# Patient Record
Sex: FEMALE | Race: WHITE | NOT HISPANIC OR LATINO
[De-identification: names, ages, dates, MRNs, and addresses within clinical notes are randomized per-mention and may not be internally consistent; named-entity substitution may affect disease eponyms.]

---

## 2017-12-28 ENCOUNTER — TRANSCRIPTION ENCOUNTER (OUTPATIENT)
Age: 32
End: 2017-12-28

## 2018-06-15 ENCOUNTER — OUTPATIENT (OUTPATIENT)
Dept: OUTPATIENT SERVICES | Facility: HOSPITAL | Age: 33
LOS: 1 days | End: 2018-06-15
Payer: COMMERCIAL

## 2018-06-15 VITALS
SYSTOLIC BLOOD PRESSURE: 100 MMHG | WEIGHT: 121.03 LBS | DIASTOLIC BLOOD PRESSURE: 70 MMHG | OXYGEN SATURATION: 100 % | HEIGHT: 60 IN | TEMPERATURE: 98 F | HEART RATE: 72 BPM | RESPIRATION RATE: 16 BRPM

## 2018-06-15 DIAGNOSIS — K40.90 UNILATERAL INGUINAL HERNIA, WITHOUT OBSTRUCTION OR GANGRENE, NOT SPECIFIED AS RECURRENT: ICD-10-CM

## 2018-06-15 DIAGNOSIS — Z01.818 ENCOUNTER FOR OTHER PREPROCEDURAL EXAMINATION: ICD-10-CM

## 2018-06-15 DIAGNOSIS — Z98.890 OTHER SPECIFIED POSTPROCEDURAL STATES: Chronic | ICD-10-CM

## 2018-06-15 PROCEDURE — G0463: CPT

## 2018-06-15 RX ORDER — LIDOCAINE HCL 20 MG/ML
0.2 VIAL (ML) INJECTION ONCE
Qty: 0 | Refills: 0 | Status: DISCONTINUED | OUTPATIENT
Start: 2018-06-29 | End: 2018-07-14

## 2018-06-15 RX ORDER — SODIUM CHLORIDE 9 MG/ML
3 INJECTION INTRAMUSCULAR; INTRAVENOUS; SUBCUTANEOUS EVERY 8 HOURS
Qty: 0 | Refills: 0 | Status: DISCONTINUED | OUTPATIENT
Start: 2018-06-29 | End: 2018-07-14

## 2018-06-15 NOTE — H&P PST ADULT - HISTORY OF PRESENT ILLNESS
33 year old female with h/o left inguinal hernia repair with mesh in 2005, reports having right inguinal hernia repair on 06/29/18.

## 2018-06-15 NOTE — H&P PST ADULT - NSANTHOSAYNRD_GEN_A_CORE
No. RAE screening performed.  STOP BANG Legend: 0-2 = LOW Risk; 3-4 = INTERMEDIATE Risk; 5-8 = HIGH Risk

## 2018-06-28 ENCOUNTER — TRANSCRIPTION ENCOUNTER (OUTPATIENT)
Age: 33
End: 2018-06-28

## 2018-06-29 ENCOUNTER — OUTPATIENT (OUTPATIENT)
Dept: OUTPATIENT SERVICES | Facility: HOSPITAL | Age: 33
LOS: 1 days | End: 2018-06-29
Payer: COMMERCIAL

## 2018-06-29 VITALS
HEART RATE: 61 BPM | OXYGEN SATURATION: 98 % | SYSTOLIC BLOOD PRESSURE: 95 MMHG | TEMPERATURE: 98 F | RESPIRATION RATE: 16 BRPM | DIASTOLIC BLOOD PRESSURE: 51 MMHG

## 2018-06-29 VITALS
HEART RATE: 64 BPM | TEMPERATURE: 98 F | DIASTOLIC BLOOD PRESSURE: 65 MMHG | RESPIRATION RATE: 18 BRPM | SYSTOLIC BLOOD PRESSURE: 103 MMHG | OXYGEN SATURATION: 100 % | WEIGHT: 121.03 LBS | HEIGHT: 60 IN

## 2018-06-29 DIAGNOSIS — K40.90 UNILATERAL INGUINAL HERNIA, WITHOUT OBSTRUCTION OR GANGRENE, NOT SPECIFIED AS RECURRENT: ICD-10-CM

## 2018-06-29 DIAGNOSIS — Z98.890 OTHER SPECIFIED POSTPROCEDURAL STATES: Chronic | ICD-10-CM

## 2018-06-29 PROCEDURE — C1781: CPT

## 2018-06-29 PROCEDURE — 49650 LAP ING HERNIA REPAIR INIT: CPT | Mod: RT

## 2018-06-29 RX ORDER — ONDANSETRON 8 MG/1
4 TABLET, FILM COATED ORAL ONCE
Qty: 0 | Refills: 0 | Status: COMPLETED | OUTPATIENT
Start: 2018-06-29 | End: 2018-06-29

## 2018-06-29 RX ORDER — SODIUM CHLORIDE 9 MG/ML
1000 INJECTION, SOLUTION INTRAVENOUS
Qty: 0 | Refills: 0 | Status: DISCONTINUED | OUTPATIENT
Start: 2018-06-29 | End: 2018-07-14

## 2018-06-29 RX ORDER — CEFAZOLIN SODIUM 1 G
2000 VIAL (EA) INJECTION ONCE
Qty: 0 | Refills: 0 | Status: COMPLETED | OUTPATIENT
Start: 2018-06-29 | End: 2018-06-29

## 2018-06-29 RX ORDER — ACETAMINOPHEN 500 MG
1000 TABLET ORAL ONCE
Qty: 0 | Refills: 0 | Status: COMPLETED | OUTPATIENT
Start: 2018-06-29 | End: 2018-06-29

## 2018-06-29 RX ORDER — OXYCODONE HYDROCHLORIDE 5 MG/1
10 TABLET ORAL ONCE
Qty: 0 | Refills: 0 | Status: DISCONTINUED | OUTPATIENT
Start: 2018-06-29 | End: 2018-06-29

## 2018-06-29 RX ORDER — MORPHINE SULFATE 50 MG/1
2 CAPSULE, EXTENDED RELEASE ORAL
Qty: 0 | Refills: 0 | Status: DISCONTINUED | OUTPATIENT
Start: 2018-06-29 | End: 2018-06-29

## 2018-06-29 RX ORDER — OXYCODONE HYDROCHLORIDE 5 MG/1
5 TABLET ORAL ONCE
Qty: 0 | Refills: 0 | Status: DISCONTINUED | OUTPATIENT
Start: 2018-06-29 | End: 2018-06-29

## 2018-06-29 RX ORDER — FAMOTIDINE 10 MG/ML
20 INJECTION INTRAVENOUS ONCE
Qty: 0 | Refills: 0 | Status: COMPLETED | OUTPATIENT
Start: 2018-06-29 | End: 2018-06-29

## 2018-06-29 RX ADMIN — Medication 400 MILLIGRAM(S): at 11:58

## 2018-06-29 RX ADMIN — OXYCODONE HYDROCHLORIDE 10 MILLIGRAM(S): 5 TABLET ORAL at 11:59

## 2018-06-29 RX ADMIN — MORPHINE SULFATE 2 MILLIGRAM(S): 50 CAPSULE, EXTENDED RELEASE ORAL at 11:58

## 2018-06-29 RX ADMIN — FAMOTIDINE 20 MILLIGRAM(S): 10 INJECTION INTRAVENOUS at 08:29

## 2018-06-29 RX ADMIN — ONDANSETRON 4 MILLIGRAM(S): 8 TABLET, FILM COATED ORAL at 11:58

## 2018-06-29 NOTE — ASU DISCHARGE PLAN (ADULT/PEDIATRIC). - NOTIFY
Swelling that continues/Fever greater than 101/Bleeding that does not stop Fever greater than 101/Swelling that continues/Bleeding that does not stop/Unable to Urinate

## 2018-06-29 NOTE — BRIEF OPERATIVE NOTE - PROCEDURE
<<-----Click on this checkbox to enter Procedure Inguinal hernia repair with mesh  06/29/2018  Right, laparoscopic  Active  [id number]

## 2018-06-29 NOTE — ASU DISCHARGE PLAN (ADULT/PEDIATRIC). - MEDICATION SUMMARY - MEDICATIONS TO TAKE
I will START or STAY ON the medications listed below when I get home from the hospital:    birth control pill  -- 1 tab(s) by mouth once a day  -- Indication: For Home medication    Percocet 5/325 oral tablet  -- 1 tab(s) by mouth every 6 hours, As Needed MDD:4 tabs  -- Caution federal law prohibits the transfer of this drug to any person other  than the person for whom it was prescribed.  May cause drowsiness.  Alcohol may intensify this effect.  Use care when operating dangerous machinery.  This prescription cannot be refilled.  This product contains acetaminophen.  Do not use  with any other product containing acetaminophen to prevent possible liver damage.  Using more of this medication than prescribed may cause serious breathing problems.    -- Indication: For Pain    Tylenol 325 mg oral tablet  -- 2 tab(s) by mouth every 4 hours, As Needed  -- Indication: For Pain

## 2019-04-02 ENCOUNTER — TRANSCRIPTION ENCOUNTER (OUTPATIENT)
Age: 34
End: 2019-04-02

## 2020-09-25 PROBLEM — J45.909 UNSPECIFIED ASTHMA, UNCOMPLICATED: Chronic | Status: ACTIVE | Noted: 2018-06-15

## 2020-09-25 PROBLEM — Z91.010 ALLERGY TO PEANUTS: Chronic | Status: ACTIVE | Noted: 2018-06-15

## 2020-09-28 NOTE — H&P PST ADULT - GENERAL
Diane Mark is a 56 y.o. female who presents today for the following:  Chief Complaint   Patient presents with    Multiple Sclerosis     \"pain getting worse\"       This is an in office visit    HPI  Historical Data    Patient is known to the practice and is previously seen by Dr. Cat Starks  Neurologic diagnosis  Relapsing remitting multiple sclerosis  Prior DMT  Tecfidera  Copaxone    Anxiety  Under the care of Dr. Wali Garza  Uses clonazepam or Xanax    Fibromyalgia  Continues on gabapentin    Tobacco use      Interim Data:     Patient is been admitted multiple times to the hospital for MS exacerbation  She has had repeated MRI scans which show new enhancing lesions each time  She did not on DMT therapy    She complains of extreme pain  She is using gabapentin 400 mg taking a total of 8 tablets daily  She is also using Cymbalta    For anxiety  She uses clonazepam or Xanax    Leg weakness  Using a walker at home  Has frequent falls  For longer distances like today she is in a wheelchair          Allergies   Allergen Reactions    Morphine Rash       Current Outpatient Medications   Medication Sig    gabapentin (NEURONTIN) 400 mg capsule Take 2 Caps by mouth two (2) times a day. Max Daily Amount: 1,600 mg. Take 2 capsules 4 times a day    ALPRAZolam (Xanax) 0.25 mg tablet Take 1 Tab by mouth two (2) times daily as needed for Anxiety. Max Daily Amount: 0.5 mg. Indications: anxiousness associated with depression    DULoxetine (CYMBALTA) 60 mg capsule Take 1 Cap by mouth daily. Indications: neuropathic pain    cyclobenzaprine (FLEXERIL) 10 mg tablet Take 1 Tab by mouth three (3) times daily as needed for Muscle Spasm(s).  clonazePAM (KlonoPIN) 0.5 mg tablet Take 1 Tab by mouth two (2) times a day. Max Daily Amount: 1 mg.  melatonin 3 mg tablet Take 1 Tab by mouth nightly as needed for Insomnia. (Patient taking differently: Take 3 mg by mouth nightly as needed for Insomnia.  Indications: Pt state she takes 5 mg tab nightly.)    ibuprofen (MOTRIN) 600 mg tablet Take 1 Tab by mouth every six (6) hours as needed for Pain. (Patient taking differently: Take 200 mg by mouth.)    docusate sodium (COLACE) 100 mg capsule Take 1 Cap by mouth daily for 90 days.  predniSONE (DELTASONE) 10 mg tablet 40 mg daily x 3 days  30 mg daily x 2 days  20 mg daily x 3 days  10 mg daily x 2 days  Then stop    ibuprofen (MOTRIN) 200 mg tablet Take  by mouth every six (6) hours as needed for Pain. Take three tablets every 6 hours as needed by mouth     senna-docusate (PERICOLACE) 8.6-50 mg per tablet Take 2 Tabs by mouth daily. No current facility-administered medications for this visit.         Past Medical History:   Diagnosis Date    Body aches 12/11/2014    Chronic pain     Depression     GERD (gastroesophageal reflux disease)     Headache(784.0)     History of mammogram never before    MS (multiple sclerosis) ([facility]     Neurological disorder     Multiple Sclerosis    Pap smear for cervical cancer screening 2008    Psychiatric disorder     anxiety    Psychotic disorder [facility])        Past Surgical History:   Procedure Laterality Date    COLONOSCOPY N/A 2/28/2018    COLON[facility]COPY performed by Eleazar Valentine [facility] at [facility] ENDOSCOPY    HX CHOLECYSTECTOMY      HX GYN      3 c-sections    HX HEENT      bilateral ear tube surgery    HX HERNIA REPAIR      HX OTHER SURGICAL      R inguinal hernia repair       Family History   Problem Relation Age of Onset    Heart Attack Father         tripple bypass    Cancer Father         lung    COPD Father     Diabetes Mother         type 2    Heart Disease Mother         heart disease    Diabetes Paternal Grandmother     No Known Problems Sister     No Known Problems Brother     No Known Problems Child        Social History     Socioeconomic History    Marital status: LEGALLY      Spouse name: Not on file    Number of children: Not on file    Years of education: Not on file    Highest education level: Not on file   Tobacco Use    Smoking status: Current Every Day Smoker     Packs/day: 0.50     Years: 17.00     Pack years: 8.50     Types: Cigarettes    Smokeless tobacco: Never Used    Tobacco comment: 1 pack every 3 days. Substance and Sexual Activity    Alcohol use: No    Drug use: No    Sexual activity: Yes     Partners: Male         ROS  Other than what is stated above under HPI there is no new or changing issues related to the following:  Constitutional: No recent weight change, fever,fatigue, sleep difficulties, or loss of appetite. ENT/Mouth:  No hearing loss, ringing in the ears, chronic sinus problem, nose bleeds sore throat, voice change, hoarseness, swollen glands in neck, or difficulties with chewing and swallowing. Cardiovascular:  No chest pain/angina pectoris, palpitations,swelling of feet/ankles/hands, or calf pain while walking. Respiratory: No chronic or frequent coughs, spitting up blood, shortness of breath, asthma, or wheezing. Gastrointestinal: No abdominal pain, heartburn, nausea, vomiting, constipation, frequent diarrhea, rectal bleeding, or blood in stool. Genitourinary: No frequent urination, burning or painful urination, blood in urine, incontinence or dribbling. Musculoskeletal:   No joint pain, stiffness/swelling, weakness of muscles, muscle pain/cramp, or back pain. Integument:   No rash/itching, change in skin color, change in hair/nails, or change in color/size of moles. Neurological:  No dizziness/vertigo, loss of consciousness, numbness/tingling sensation, tremors, weakness in limbs, difficulty with balance, frequent or recurring headaches, memory loss or confusion. Psychiatric:   No nervousness, depression, hallucinations, paranoia or suspiciousness. Endocrine: No excessive thirst or urination, heat or cold intolerance. Hematologic/Lymphatic: No bleeding/bruising tendency, phlebitis, or past transfusion. EXAMINATION  Visit Vitals  /80 (BP 1 Location: Left arm)   Pulse 83   Temp 98.2 °F (36.8 °C) (Oral)   Resp 18   Ht 5' 4\" (1.626 m)   Wt 186 lb (84.4 kg)   SpO2 98%   BMI 31.93 kg/m²            General appearance: Patient is well-developed and well-nourished in no apparent distress and well groomed. Psych/mental health:  Affect: Tearful at times but overall appropriate    PHQ  3 most recent PHQ Screens 9/28/2020   PHQ Not Done Patient Decline   Little interest or pleasure in doing things -   Feeling down, depressed, irritable, or hopeless -   Total Score PHQ 2 -   Trouble falling or staying asleep, or sleeping too much -   Feeling tired or having little energy -   Poor appetite, weight loss, or overeating -   Feeling bad about yourself - or that you are a failure or have let yourself or your family down -   Trouble concentrating on things such as school, work, reading, or watching TV -   Moving or speaking so slowly that other people could have noticed; or the opposite being so fidgety that others notice -   Thoughts of being better off dead, or hurting yourself in some way -   PHQ 9 Score -   How difficult have these problems made it for you to do your work, take care of your home and get along with others -       HEENT: Normocephalic, without evidence of trauma  Full range of motion, no tenderness to palpation in the head or neck region     Cardiovascular: Extremities warm to touch, no swelling appreciated    Respiratory: No dyspnea on exertion noted, normal effort on casual observation    Musculoskeletal: No evidence of significant bony deformities or spinal curvature    Integumentary:  No obvious bruising, lacerations or discoloaration on casual observation. Neurological Examination:   Mental Status:        MMSE  No flowsheet data found. Formal testing was not completed    there was nothing concerning on general observation and discussion.    Alert oriented and appropriate to general conversation  Normal processing on general observation  Followed conversation and responded seemingly appropriate throughout the office visit  No word finding difficulties noted on casual observation  Able to follow directions without difficulty     Cranial Nerves:    PERRLA,   EOMs intact gaze is conjugate  No nystagmus is appreciated  No ALLYSON  Facial motor and sensory intact bilaterally  Hearing intact to conversation  Voice with normal projection, no evidence of secretion pooling  Palate elevates symmetrically  Shoulder shrug intact bilaterally  No tongue deviation appreciated     Motor:   Normal tone and bulk  Fine dexterity intact bilaterally  No tremor appreciated on today's exam  No abnormal movements appreciated on today's exam    Muscle strength testing:  Right side  Upper extremities  Deltoid 5/5  Bicep 5/5  Tricep 5/5  Hand grasp 5/5    Lower extremity  Hip flexor 5/5  Extensor 5/5  Flexor 5/5  Plantar flexion 5/5  Dorsiflexion 5/5      Left side  Upper extremity  Deltoid 5/5  Bicep 5/5  Tricep 5/5  Hand grasp 5/5    Lower extremity  Hip flexor 5/5  Extensor 5/5  Flexor 5/5  Plantar flexion 5/5  Dorsiflexion 5/5    Sensation: Intact to light touch bilaterally    Coordination/Cerebellar:   Grossly intact    Gait: Ambulation was not tested as patient was in a wheelchair; she does have 5/5 strength throughout but since she did not have her walker with her I did not ambulate her      Fall risk assessment  Fall Risk Assessment, last 12 mths 5/13/2020   Able to walk? Yes   Fall in past 12 months? Yes   Fall with injury? No   Number of falls in past 12 months 5   Fall Risk Score 5       Reflexes: Not tested    ASSESSMENT AND PLAN    Patient is known to this practice. This is my first time seeing the patient. Chart and history reviewed in detail at today's office visit.     Multiple sclerosis  With frequent clinical exacerbation requiring hospitalization  With significant gadolinium enhancing lesions over the past year  Patient has paraparesis with frequent falls using a walker and a wheelchair is appropriate [individual strength testing is 5/5 which I find interesting]  Patient is to be on drug modifying therapy she is extremely active disease with progressive disability  After discussing options related to DMT's we have elected to go on [facility] paperwork has been initiated and baseline blood work has also been ordered    Chronic pain issues  Gabapentin has been renewed with an increased dose of 400 mg 4 times daily     Anxiety  Xanax is been ordered lorazepam is been discontinued    [At the time of the office visit I was unaware the gabapentin and Klonopin/Xanax were under the direction of her PCP I will address this with the patient at her follow-up visit]      ICD-10-CM ICD-9-CM    1. MS (multiple sclerosis) ([facility]  G35 340 CBC WITH AUTOMATED DIFF      LUCIO VIRUS DNA BY PCR, QL      QUANTIFERON-TB GOLD PLUS      VZV AB, IGG      HEPATITIS B EVALUATION      METABOLIC PANEL, COMPREHENSIVE      MISC. LAB TEST      MRI BRAIN W WO CONT      gabapentin (NEURONTIN) 400 mg capsule      DULoxetine (CYMBALTA) 60 mg capsule   2. Dem[facility]inating [facility] ([facility]  G37.9 341.9 MISC. LAB TEST      MISC. LAB TEST   3. Encounter for screening for other viral diseases  Z11.59 V73.89 CBC WITH AUTOMATED DIFF      LUCIO VIRUS DNA BY PCR, QL      QUANTIFERON-TB GOLD PLUS      VZV AB, IGG      HEPATITIS B EVALUATION      METABOLIC PANEL, COMPREHENSIVE      MISC. LAB TEST   4. Chronic pain syndrome  G89.4 338.4 gabapentin (NEURONTIN) 400 mg capsule      DULoxetine (CYMBALTA) 60 mg capsule   5. Tobacco abuse  Z72.0 305.1 gabapentin (NEURONTIN) 400 mg capsule      DULoxetine (CYMBALTA) 60 mg capsule   6. Anxiety  F41.9 300.00 gabapentin (NEURONTIN) 400 mg capsule      ALPRAZolam (Xanax) 0.25 mg tablet   7. Adjustment disorder with mixed anxiety and depressed mood  F43.23 309.28 gabapentin (NEURONTIN) 400 mg capsule   8.  Severe episode of recurrent major depressive disorder, without psychotic features ([facility] [address]  F33.2 296.33 gabapentin (NEURONTIN) 400 mg capsule           Additional pertinent medical data reviewed at today's office visit:       No results displayed because visit has over 200 results. No results displayed because visit has over 200 results. XR Results (maximum last 3): Results from [place] encounter on 03/23/20   XR CHEST PORT    Impression Impression: No acute process. Results from [place] encounter on 03/08/20   XR SWALLOW FUNC VIDEO    Impression IMPRESSION: There is consistent, minimal penetration of thin liquids. There was  a single episode of aspiration of a minimal volume of thin liquid. There is a  single instance of aspiration of a minimal volume of nectar-thick liquid. There  is premature bolus loss to the vallecula and piriform sinuses, more so within  them with nectar. No penetration or aspiration of pudding or solids. Air kerma (radiation dose): 10.6 mGy    Results from Hospital Encounter encounter on 12/22/18   XR FOOT RT MIN 3 V    Impression IMPRESSION:  1. Fracture at the base of the 5th metatarsal.   2. Posttraumatic changes in the ankle. CT Results (maximum last 3): Results from [place] encounter on 08/06/20   CT HEAD WO CONT    Impression IMPRESSION:      White matter lesions consistent with the patient's diagnosis of multiple  sclerosis. At least 2 new areas of vasogenic edema when compared to March, 2020,  suspicious for acute demyelination. Results from [place] encounter on 03/23/20   CTA HEAD NECK W CONT    Impression IMPRESSION:   No major vessel occlusion. No intracranial mass, hemorrhage or evidence of acute infarction. Imaging findings consistent with moderate to severe predominantly supratentorial  demyelinating disease. .    CT HEAD WO CONT    Impression IMPRESSION:   1. No acute intracranial hemorrhage, mass or infarct.    2. Persistent periventricular white matter hypodensities, correlating with  patient's known MS plaques, unchanged. MR can be performed to better  characterize, as clinically indicated. MRI Results (maximum last 3): Results from [place] encounter on 08/31/20   MRI [facility] SPINE W WO CONT    Impression IMPRESSION:  1. Progressive multifocal areas of cord signal abnormality within the lower  cervical and thoracic cord. None demonstrate postcontrast enhancement. I            MRI CERV SPINE W WO CONT    Impression IMPRESSION:    1. Stable extensive, patchy chronic demyelinating plaques throughout the  cervical and upper thoracic cord. No evidence of active demyelination. MRI BRAIN W WO CONT    Impression IMPRESSION:   1. Three new active, enhancing demyelinating plaques in the right supratentorial  white matter as above. 2. Previously seen enhancing active demyelinating plaques have diminished in  enhancement as above. 3. Otherwise stable extensive confluent supratentorial white matter lesions, and  a single infratentorial white matter lesion in the right surekha, consistent with  demyelinating disease. 4. Stable mild parenchymal volume loss. Follow-up and Dispositions    · Return in about 2 months (around 11/28/2020) for In office appointment.            Victor Manuel Mendoza, MS, ANP-BC, [facility] negative

## 2020-10-21 ENCOUNTER — APPOINTMENT (OUTPATIENT)
Dept: FAMILY MEDICINE | Facility: CLINIC | Age: 35
End: 2020-10-21
Payer: COMMERCIAL

## 2020-10-21 VITALS
OXYGEN SATURATION: 98 % | WEIGHT: 130 LBS | SYSTOLIC BLOOD PRESSURE: 118 MMHG | HEART RATE: 81 BPM | HEIGHT: 61 IN | BODY MASS INDEX: 24.55 KG/M2 | DIASTOLIC BLOOD PRESSURE: 70 MMHG

## 2020-10-21 DIAGNOSIS — J30.9 ALLERGIC RHINITIS, UNSPECIFIED: ICD-10-CM

## 2020-10-21 DIAGNOSIS — Z00.00 ENCOUNTER FOR GENERAL ADULT MEDICAL EXAMINATION W/OUT ABNORMAL FINDINGS: ICD-10-CM

## 2020-10-21 DIAGNOSIS — Z23 ENCOUNTER FOR IMMUNIZATION: ICD-10-CM

## 2020-10-21 DIAGNOSIS — Z84.1 FAMILY HISTORY OF DISORDERS OF KIDNEY AND URETER: ICD-10-CM

## 2020-10-21 DIAGNOSIS — J45.909 UNSPECIFIED ASTHMA, UNCOMPLICATED: ICD-10-CM

## 2020-10-21 LAB
ALBUMIN SERPL ELPH-MCNC: 4.2 G/DL
ALP BLD-CCNC: 50 U/L
ALT SERPL-CCNC: 28 U/L
ANION GAP SERPL CALC-SCNC: 12 MMOL/L
APPEARANCE: CLEAR
AST SERPL-CCNC: 25 U/L
BASOPHILS # BLD AUTO: 0.02 K/UL
BASOPHILS NFR BLD AUTO: 0.4 %
BILIRUB SERPL-MCNC: 0.5 MG/DL
BILIRUBIN URINE: NEGATIVE
BLOOD URINE: NEGATIVE
BUN SERPL-MCNC: 12 MG/DL
CALCIUM SERPL-MCNC: 8.9 MG/DL
CHLORIDE SERPL-SCNC: 104 MMOL/L
CHOLEST SERPL-MCNC: 171 MG/DL
CO2 SERPL-SCNC: 23 MMOL/L
COLOR: YELLOW
CREAT SERPL-MCNC: 0.69 MG/DL
EOSINOPHIL # BLD AUTO: 0.04 K/UL
EOSINOPHIL NFR BLD AUTO: 0.8 %
GLUCOSE QUALITATIVE U: NEGATIVE
GLUCOSE SERPL-MCNC: 90 MG/DL
HCT VFR BLD CALC: 35.2 %
HDLC SERPL-MCNC: 84 MG/DL
HGB BLD-MCNC: 11.7 G/DL
IMM GRANULOCYTES NFR BLD AUTO: 0.4 %
KETONES URINE: NEGATIVE
LDLC SERPL CALC-MCNC: 71 MG/DL
LEUKOCYTE ESTERASE URINE: NEGATIVE
LYMPHOCYTES # BLD AUTO: 1.44 K/UL
LYMPHOCYTES NFR BLD AUTO: 30.4 %
MAN DIFF?: NORMAL
MCHC RBC-ENTMCNC: 31.7 PG
MCHC RBC-ENTMCNC: 33.2 GM/DL
MCV RBC AUTO: 95.4 FL
MONOCYTES # BLD AUTO: 0.3 K/UL
MONOCYTES NFR BLD AUTO: 6.3 %
NEUTROPHILS # BLD AUTO: 2.92 K/UL
NEUTROPHILS NFR BLD AUTO: 61.7 %
NITRITE URINE: NEGATIVE
NONHDLC SERPL-MCNC: 87 MG/DL
PH URINE: 6.5
PLATELET # BLD AUTO: 202 K/UL
POTASSIUM SERPL-SCNC: 4 MMOL/L
PROT SERPL-MCNC: 6.8 G/DL
PROTEIN URINE: NEGATIVE
RBC # BLD: 3.69 M/UL
RBC # FLD: 13.9 %
SODIUM SERPL-SCNC: 139 MMOL/L
SPECIFIC GRAVITY URINE: 1.02
TRIGL SERPL-MCNC: 79 MG/DL
TSH SERPL-ACNC: 3.02 UIU/ML
UROBILINOGEN URINE: NORMAL
WBC # FLD AUTO: 4.74 K/UL

## 2020-10-21 PROCEDURE — 99385 PREV VISIT NEW AGE 18-39: CPT | Mod: 25

## 2020-10-21 PROCEDURE — 90686 IIV4 VACC NO PRSV 0.5 ML IM: CPT

## 2020-10-21 PROCEDURE — G0008: CPT

## 2020-10-21 PROCEDURE — 99072 ADDL SUPL MATRL&STAF TM PHE: CPT

## 2020-10-21 PROCEDURE — 36415 COLL VENOUS BLD VENIPUNCTURE: CPT

## 2020-10-21 RX ORDER — ALBUTEROL 90 MCG
90 AEROSOL (GRAM) INHALATION
Refills: 0 | Status: ACTIVE | COMMUNITY

## 2020-10-21 NOTE — HEALTH RISK ASSESSMENT
[Excellent] : ~his/her~  mood as  excellent [] : No [Yes] : Yes [No falls in past year] : Patient reported no falls in the past year [0] : 2) Feeling down, depressed, or hopeless: Not at all (0) [[id number]] : 0 [With Significant Other] : lives with significant other [With Family] : lives with family [Employed] : employed [] :  [# Of Children ___] : has [unfilled] children [Fully functional (bathing, dressing, toileting, transferring, walking, feeding)] : Fully functional (bathing, dressing, toileting, transferring, walking, feeding) [Fully functional (using the telephone, shopping, preparing meals, housekeeping, doing laundry, using] : Fully functional and needs no help or supervision to perform IADLs (using the telephone, shopping, preparing meals, housekeeping, doing laundry, using transportation, managing medications and managing finances) [Smoke Detector] : smoke detector [Seat Belt] :  uses seat belt [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

## 2020-10-21 NOTE — HISTORY OF PRESENT ILLNESS
[de-identified] : 35 year old female here to establish care and for a full physical examination. The patients complete medical, family and social history was documented and reviewed with the patient.  The patients medications were identified and also reviewed with the patient as well as any allergies to any medications. All active and previous medical problems were identified and discussed with the patient.  Any new problems were documented. Patient is feeling well today.\par

## 2020-10-21 NOTE — ASSESSMENT
[FreeTextEntry1] : Patient was counseled on healthy eating habits, on daily exercise and stress relief. All medications and allergies were reviewed with the patient and any adjustments necessary were made. Patient was counseled to try engage in an exercise activity for at least 30 minutes 3-4 times a week.  Patient was advised to eat a diet low in fat and carbohydrates and high in protein, with plenty of vegetables. Patient was advised to try and engage in relaxing activities whenever possible.\par The patients blood was draw in office and will be followed and assessed for any issues.  Patient was told to return to the office if any issues arise.  Unless otherwise stated, the patient is to continue all other medications as previously prescribed.\par \par asthma\par very well controlled, uses albuterol very sparingly\par \par

## 2020-10-22 LAB
ESTIMATED AVERAGE GLUCOSE: 74 MG/DL
HBA1C MFR BLD HPLC: 4.2 %
SARS-COV-2 IGG SERPL IA-ACNC: <0.1 INDEX
SARS-COV-2 IGG SERPL QL IA: NEGATIVE

## 2020-10-23 LAB — 25(OH)D3 SERPL-MCNC: 25.9 NG/ML

## 2020-11-16 NOTE — H&P PST ADULT - PSYCHIATRIC
Dr. House,  Mom states that she would like to repeat imaging as discussed last visit. Pt had braces removed temporarily over the weekend in order for imaging to be clearer.Currently, there are no orders placed for the imaging that mom is requesting. Please advise.   negative Affect and characteristics of appearance, verbalizations, behaviors are appropriate

## 2021-03-18 ENCOUNTER — TRANSCRIPTION ENCOUNTER (OUTPATIENT)
Age: 36
End: 2021-03-18

## 2021-07-20 ENCOUNTER — APPOINTMENT (OUTPATIENT)
Dept: OBGYN | Facility: CLINIC | Age: 36
End: 2021-07-20

## 2021-07-30 ENCOUNTER — RESULT REVIEW (OUTPATIENT)
Age: 36
End: 2021-07-30

## 2022-02-24 ENCOUNTER — INPATIENT (INPATIENT)
Facility: HOSPITAL | Age: 37
LOS: 0 days | Discharge: HOME | End: 2022-02-25
Attending: OBSTETRICS & GYNECOLOGY | Admitting: OBSTETRICS & GYNECOLOGY
Payer: COMMERCIAL

## 2022-02-24 VITALS — OXYGEN SATURATION: 100 %

## 2022-02-24 DIAGNOSIS — O26.899 OTHER SPECIFIED PREGNANCY RELATED CONDITIONS, UNSPECIFIED TRIMESTER: ICD-10-CM

## 2022-02-24 DIAGNOSIS — Z98.890 OTHER SPECIFIED POSTPROCEDURAL STATES: Chronic | ICD-10-CM

## 2022-02-24 DIAGNOSIS — Z34.80 ENCOUNTER FOR SUPERVISION OF OTHER NORMAL PREGNANCY, UNSPECIFIED TRIMESTER: ICD-10-CM

## 2022-02-24 DIAGNOSIS — Z3A.00 WEEKS OF GESTATION OF PREGNANCY NOT SPECIFIED: ICD-10-CM

## 2022-02-24 LAB
BASOPHILS # BLD AUTO: 0.01 K/UL — SIGNIFICANT CHANGE UP (ref 0–0.2)
BASOPHILS NFR BLD AUTO: 0.1 % — SIGNIFICANT CHANGE UP (ref 0–2)
BLD GP AB SCN SERPL QL: NEGATIVE — SIGNIFICANT CHANGE UP
COVID-19 SPIKE DOMAIN AB INTERP: POSITIVE
COVID-19 SPIKE DOMAIN ANTIBODY RESULT: >250 U/ML — HIGH
EOSINOPHIL # BLD AUTO: 0.06 K/UL — SIGNIFICANT CHANGE UP (ref 0–0.5)
EOSINOPHIL NFR BLD AUTO: 0.7 % — SIGNIFICANT CHANGE UP (ref 0–6)
HCT VFR BLD CALC: 39.6 % — SIGNIFICANT CHANGE UP (ref 34.5–45)
HGB BLD-MCNC: 13.1 G/DL — SIGNIFICANT CHANGE UP (ref 11.5–15.5)
IMM GRANULOCYTES NFR BLD AUTO: 0.8 % — SIGNIFICANT CHANGE UP (ref 0–1.5)
LYMPHOCYTES # BLD AUTO: 1.27 K/UL — SIGNIFICANT CHANGE UP (ref 1–3.3)
LYMPHOCYTES # BLD AUTO: 14.8 % — SIGNIFICANT CHANGE UP (ref 13–44)
MCHC RBC-ENTMCNC: 30.7 PG — SIGNIFICANT CHANGE UP (ref 27–34)
MCHC RBC-ENTMCNC: 33.1 GM/DL — SIGNIFICANT CHANGE UP (ref 32–36)
MCV RBC AUTO: 92.7 FL — SIGNIFICANT CHANGE UP (ref 80–100)
MONOCYTES # BLD AUTO: 0.51 K/UL — SIGNIFICANT CHANGE UP (ref 0–0.9)
MONOCYTES NFR BLD AUTO: 6 % — SIGNIFICANT CHANGE UP (ref 2–14)
NEUTROPHILS # BLD AUTO: 6.65 K/UL — SIGNIFICANT CHANGE UP (ref 1.8–7.4)
NEUTROPHILS NFR BLD AUTO: 77.6 % — HIGH (ref 43–77)
NRBC # BLD: 0 /100 WBCS — SIGNIFICANT CHANGE UP (ref 0–0)
PLATELET # BLD AUTO: 153 K/UL — SIGNIFICANT CHANGE UP (ref 150–400)
RBC # BLD: 4.27 M/UL — SIGNIFICANT CHANGE UP (ref 3.8–5.2)
RBC # FLD: 13.9 % — SIGNIFICANT CHANGE UP (ref 10.3–14.5)
RH IG SCN BLD-IMP: POSITIVE — SIGNIFICANT CHANGE UP
RH IG SCN BLD-IMP: POSITIVE — SIGNIFICANT CHANGE UP
SARS-COV-2 IGG+IGM SERPL QL IA: >250 U/ML — HIGH
SARS-COV-2 IGG+IGM SERPL QL IA: POSITIVE
SARS-COV-2 RNA SPEC QL NAA+PROBE: DETECTED
WBC # BLD: 8.57 K/UL — SIGNIFICANT CHANGE UP (ref 3.8–10.5)
WBC # FLD AUTO: 8.57 K/UL — SIGNIFICANT CHANGE UP (ref 3.8–10.5)

## 2022-02-24 RX ORDER — AMPICILLIN TRIHYDRATE 250 MG
2 CAPSULE ORAL EVERY 6 HOURS
Refills: 0 | Status: DISCONTINUED | OUTPATIENT
Start: 2022-02-25 | End: 2022-02-25

## 2022-02-24 RX ORDER — LANOLIN
1 OINTMENT (GRAM) TOPICAL EVERY 6 HOURS
Refills: 0 | Status: DISCONTINUED | OUTPATIENT
Start: 2022-02-24 | End: 2022-02-25

## 2022-02-24 RX ORDER — AMPICILLIN TRIHYDRATE 250 MG
2 CAPSULE ORAL ONCE
Refills: 0 | Status: COMPLETED | OUTPATIENT
Start: 2022-02-24 | End: 2022-02-24

## 2022-02-24 RX ORDER — HYDROCORTISONE 1 %
1 OINTMENT (GRAM) TOPICAL EVERY 6 HOURS
Refills: 0 | Status: DISCONTINUED | OUTPATIENT
Start: 2022-02-24 | End: 2022-02-25

## 2022-02-24 RX ORDER — IBUPROFEN 200 MG
600 TABLET ORAL EVERY 6 HOURS
Refills: 0 | Status: COMPLETED | OUTPATIENT
Start: 2022-02-24 | End: 2023-01-23

## 2022-02-24 RX ORDER — TETANUS TOXOID, REDUCED DIPHTHERIA TOXOID AND ACELLULAR PERTUSSIS VACCINE, ADSORBED 5; 2.5; 8; 8; 2.5 [IU]/.5ML; [IU]/.5ML; UG/.5ML; UG/.5ML; UG/.5ML
0.5 SUSPENSION INTRAMUSCULAR ONCE
Refills: 0 | Status: DISCONTINUED | OUTPATIENT
Start: 2022-02-24 | End: 2022-02-25

## 2022-02-24 RX ORDER — AMPICILLIN TRIHYDRATE 250 MG
CAPSULE ORAL
Refills: 0 | Status: DISCONTINUED | OUTPATIENT
Start: 2022-02-24 | End: 2022-02-25

## 2022-02-24 RX ORDER — SODIUM CHLORIDE 9 MG/ML
1000 INJECTION, SOLUTION INTRAVENOUS
Refills: 0 | Status: DISCONTINUED | OUTPATIENT
Start: 2022-02-24 | End: 2022-02-24

## 2022-02-24 RX ORDER — SODIUM CHLORIDE 9 MG/ML
3 INJECTION INTRAMUSCULAR; INTRAVENOUS; SUBCUTANEOUS EVERY 8 HOURS
Refills: 0 | Status: DISCONTINUED | OUTPATIENT
Start: 2022-02-24 | End: 2022-02-25

## 2022-02-24 RX ORDER — PRAMOXINE HYDROCHLORIDE 150 MG/15G
1 AEROSOL, FOAM RECTAL EVERY 4 HOURS
Refills: 0 | Status: DISCONTINUED | OUTPATIENT
Start: 2022-02-24 | End: 2022-02-25

## 2022-02-24 RX ORDER — CITRIC ACID/SODIUM CITRATE 300-500 MG
15 SOLUTION, ORAL ORAL EVERY 6 HOURS
Refills: 0 | Status: DISCONTINUED | OUTPATIENT
Start: 2022-02-24 | End: 2022-02-24

## 2022-02-24 RX ORDER — DIPHENHYDRAMINE HCL 50 MG
25 CAPSULE ORAL EVERY 6 HOURS
Refills: 0 | Status: DISCONTINUED | OUTPATIENT
Start: 2022-02-24 | End: 2022-02-25

## 2022-02-24 RX ORDER — MAGNESIUM HYDROXIDE 400 MG/1
30 TABLET, CHEWABLE ORAL
Refills: 0 | Status: DISCONTINUED | OUTPATIENT
Start: 2022-02-24 | End: 2022-02-25

## 2022-02-24 RX ORDER — BENZOCAINE 10 %
1 GEL (GRAM) MUCOUS MEMBRANE EVERY 6 HOURS
Refills: 0 | Status: DISCONTINUED | OUTPATIENT
Start: 2022-02-24 | End: 2022-02-25

## 2022-02-24 RX ORDER — OXYTOCIN 10 UNIT/ML
333.33 VIAL (ML) INJECTION
Qty: 20 | Refills: 0 | Status: DISCONTINUED | OUTPATIENT
Start: 2022-02-24 | End: 2022-02-24

## 2022-02-24 RX ORDER — SIMETHICONE 80 MG/1
80 TABLET, CHEWABLE ORAL EVERY 4 HOURS
Refills: 0 | Status: DISCONTINUED | OUTPATIENT
Start: 2022-02-24 | End: 2022-02-25

## 2022-02-24 RX ORDER — AER TRAVELER 0.5 G/1
1 SOLUTION RECTAL; TOPICAL EVERY 4 HOURS
Refills: 0 | Status: DISCONTINUED | OUTPATIENT
Start: 2022-02-24 | End: 2022-02-25

## 2022-02-24 RX ORDER — OXYCODONE HYDROCHLORIDE 5 MG/1
5 TABLET ORAL
Refills: 0 | Status: DISCONTINUED | OUTPATIENT
Start: 2022-02-24 | End: 2022-02-25

## 2022-02-24 RX ORDER — IBUPROFEN 200 MG
600 TABLET ORAL EVERY 6 HOURS
Refills: 0 | Status: DISCONTINUED | OUTPATIENT
Start: 2022-02-24 | End: 2022-02-25

## 2022-02-24 RX ORDER — OXYCODONE HYDROCHLORIDE 5 MG/1
5 TABLET ORAL ONCE
Refills: 0 | Status: DISCONTINUED | OUTPATIENT
Start: 2022-02-24 | End: 2022-02-25

## 2022-02-24 RX ORDER — DIBUCAINE 1 %
1 OINTMENT (GRAM) RECTAL EVERY 6 HOURS
Refills: 0 | Status: DISCONTINUED | OUTPATIENT
Start: 2022-02-24 | End: 2022-02-25

## 2022-02-24 RX ORDER — OXYTOCIN 10 UNIT/ML
333.33 VIAL (ML) INJECTION
Qty: 20 | Refills: 0 | Status: DISCONTINUED | OUTPATIENT
Start: 2022-02-24 | End: 2022-02-25

## 2022-02-24 RX ORDER — KETOROLAC TROMETHAMINE 30 MG/ML
30 SYRINGE (ML) INJECTION ONCE
Refills: 0 | Status: DISCONTINUED | OUTPATIENT
Start: 2022-02-24 | End: 2022-02-24

## 2022-02-24 RX ORDER — ACETAMINOPHEN 500 MG
975 TABLET ORAL
Refills: 0 | Status: DISCONTINUED | OUTPATIENT
Start: 2022-02-24 | End: 2022-02-25

## 2022-02-24 RX ADMIN — Medication 200 GRAM(S): at 13:10

## 2022-02-24 RX ADMIN — Medication 975 MILLIGRAM(S): at 17:46

## 2022-02-24 RX ADMIN — Medication 1000 MILLIUNIT(S)/MIN: at 14:49

## 2022-02-24 RX ADMIN — Medication 975 MILLIGRAM(S): at 18:30

## 2022-02-24 RX ADMIN — Medication 30 MILLIGRAM(S): at 13:36

## 2022-02-24 RX ADMIN — Medication 600 MILLIGRAM(S): at 20:43

## 2022-02-24 RX ADMIN — Medication 600 MILLIGRAM(S): at 20:01

## 2022-02-24 NOTE — OB RN DELIVERY SUMMARY - NS_SEPSISRSKCALC_OBGYN_ALL_OB_FT
EOS calculated successfully. EOS Risk Factor: 0.5/1000 live births ([facility] national incidence); GA=40w;Temp=98.6; ROM=0.017; GBS='Positive'; Antibiotics='No antibiotics or any antibiotics < 2 hrs prior to birth'

## 2022-02-24 NOTE — PROVIDER CONTACT NOTE (OTHER) - ACTION/TREATMENT ORDERED:
PRINCESS Stevenson spoke with infection control Lina, who will write a note that pt does not need isolation and may go to a room
Discussed case with LEIGH Bailey. Ms. Ansari and agrees that no isolation is needed for MsJessica Leonardowilfred.

## 2022-02-24 NOTE — OB PROVIDER H&P - NSICDXPASTMEDICALHX_GEN_ALL_CORE_FT
PAST MEDICAL HISTORY:  Asthma h/o last attack 2 years ago,   not on any meds    Food allergy, peanut see list

## 2022-02-24 NOTE — OB PROVIDER DELIVERY SUMMARY - NSSELHIDDEN_OBGYN_ALL_OB_FT
[NS_DeliveryAttending1_OBGYN_ALL_OB_FT:[id number]==],[NS_DeliveryAssist1_OBGYN_ALL_OB_FT:[id number]]

## 2022-02-24 NOTE — OB PROVIDER DELIVERY SUMMARY - NSPROVIDERDELIVERYNOTE_OBGYN_ALL_OB_FT
Spontaneous vaginal delivery of liveborn infant from OA position. Nuchal x1, easily reduced. Head, shoulders, and body delivered easily. Infant was suctioned. Mild mec. Cord clamped and cut and infant passed to peds. Placenta delivered intact with a 3 vessel cord. Fundal massage was given and uterine fundus was found to be firm. Vaginal exam revealed an intact cervix, vaginal walls, and sulci. Patient had a 2nd degree laceration in the perineum that was repaired with 3.0 vicryl suture. Excellent hemostasis was noted. Patient was stable and went to recovery. Count was correct x2.

## 2022-02-24 NOTE — OB RN PATIENT PROFILE - NSMATERNALFETALCONCERNS_OBGYN_ALL_OB_FT
[FreeTextEntry1] : Pt c/o bilateral leg pain swelling heaviness and discomfort worse w activity and by the end of the day\par Onset1 year \par Intensity mod \par Pt denies recent injury, travel or thrombophilic event\par \par Pt denies  intermittent claudication or leg and foot nocturnal cramps\par \par pt states dx w left knee osteoarthritis\par pt was also dx w spinal stenosis \par pt also c/o  peng  plantar foot numbness from the mid arch to toes  onset  sept 2018\par pt states up and down steps maureen soreness and heaviness  in peng le\par pt states that she was offered  spine surg for spinal stenosis and wants to  have her le w/u further\par pt has not decided yet on surgery \par  Meconium stained fluid, precipitous delivery

## 2022-02-24 NOTE — OB PROVIDER H&P - HISTORY OF PRESENT ILLNESS
Pt is a 36y/o  at *** admitted in labor, jacky since 2am. Denies LOF, VB. +FM  Prenatal course uncomplicated  GBS   EFW 3600    OBHx:  2007, 2011, 2016, largest 9#  GynHx: denies  PMHx: asthma (hospitalized as a child, last inhaler use > 6mo ago)  PSHx: bilateral inguinal hernia repairs  Med: PNV  All: sulf (childhood allergy, unsure of reaction)  SH: neg x3     Pt is a 38y/o  at 40w0d admitted in labor, jacky since 2am. Denies LOF, VB. +FM  Prenatal course uncomplicated  GBS   EFW 3600    OBHx:  2007, 2011, 2016, largest 9#  GynHx: denies  PMHx: asthma (hospitalized as a child, last inhaler use > 6mo ago)  PSHx: bilateral inguinal hernia repairs  Med: PNV  All: sulf (childhood allergy, unsure of reaction)  SH: neg x3

## 2022-02-24 NOTE — CHART NOTE - NSCHARTNOTEFT_GEN_A_CORE
R3 Chart Note     Patient seen and evaluated at bedside - deceleration noted on EFM with spontaneous return to baseline. Patient increasingly uncomfortable.     9/100/-1, 0 station with contraction. Intact membranes.      - Dr. Ryder aware and en route to hospital    - Dr. Queen and Dr. Guerrier PGY4 at bedside     [place], PGY-3

## 2022-02-24 NOTE — OB PROVIDER H&P - ASSESSMENT
Labor: admit to L&D  -routine labs  -EFM/toco  -CLD, IV hydration  Fetal: cat 1 tracing, fetal status reassuring  GBS: neg  Analgesia:       d/w  ty pgy4 Labor: admit to L&D  -routine labs  -EFM/toco  -CLD, IV hydration  Fetal: cat 1 tracing, fetal status reassuring  GBS: pos, ampicillin  Analgesia: desires epidural      d/w Dr. Lopez and Dr. Britni crawford pgy4

## 2022-02-24 NOTE — OB PROVIDER H&P - NSHPPHYSICALEXAM_GEN_ALL_CORE
Vital Signs Last 24 Hrs  T(C): --  T(F): --  HR: 81 (24 Feb 2022 13:11) (78 - 89)  BP: 127/78 (24 Feb 2022 13:02) (127/78 - 127/78)  BP(mean): --  RR: --  SpO2: 99% (24 Feb 2022 13:11) (99% - 100%)  GA: moderate discomfort with CTX  Cards: RRR  Pulm: CTAB  EFH: 150/mod/+accels/-decels  Alba: Q2min  VE: 5.5/80/-2

## 2022-02-24 NOTE — PROVIDER CONTACT NOTE (OTHER) - BACKGROUND
Hx COVID+ 3/2021  As per Traci, anecdotally, patient stated to nursing staff that her daughter had COVID at the end of December 2021 and she herself had symptoms as well but did not get tested.
  NVD !325 with 1 degree laceration.  + covid PCR test.

## 2022-02-24 NOTE — OB RN PATIENT PROFILE - AS TEMP SITE
Pt reports using ointment previously prescribed for a scratched eye  Pt states he thinks it is an antibiotic       Norm Nicely  07/11/18 3396 oral

## 2022-02-24 NOTE — OB RN PATIENT PROFILE - FALL HARM RISK - UNIVERSAL INTERVENTIONS
Bed in lowest position, wheels locked, appropriate side rails in place/Call bell, personal items and telephone in reach/Instruct patient to call for assistance before getting out of bed or chair/Non-slip footwear when patient is out of bed/[place] to call system/Physically safe environment - no spills, clutter or unnecessary equipment/Purposeful Proactive Rounding/Room/bathroom lighting operational, light cord in reach

## 2022-02-24 NOTE — OB RN DELIVERY SUMMARY - NSSELHIDDEN_OBGYN_ALL_OB_FT
[NS_DeliveryAttending1_OBGYN_ALL_OB_FT:[id number]=],[NS_DeliveryAssist1_OBGYN_ALL_OB_FT:[id number]=],[NS_DeliveryAttending2_OBGYN_ALL_OB_FT:[id number]],[NS_DeliveryRN_OBGYN_ALL_OB_FT:[id number]=]

## 2022-02-24 NOTE — OB NEONATOLOGY/PEDIATRICIAN DELIVERY SUMMARY - NSPEDSNEONOTESA_OBGYN_ALL_OB_FT
Requested by OB to attend this  of a 40wk male infant.  Mom is 35yo , blood type O+, PNL (-), GBS +, Covid ?  Maternal hx significant for Asthma>Albuterol, S/P hernia repair. Pregnancy uncomplicated. SROM @ delivery - mec stained fluid. Mom received ampicillin x 1 dose just prior to delivery. Infant emerged in cephalic position, vigorous w/ spont.  Infant brought to warmer and received standard  resuscitation w/ good response. Testes descended b/l.  Pe unremarkable. EOS .06.  Mom plans to breast and formula feed OR formula feed, defers Hep B vaccine, declines circ, in-house PMD is Dr Austin.  Infant transitioned to non-separation and routine care.  Apgar scores 8,9.

## 2022-02-24 NOTE — PROVIDER CONTACT NOTE (OTHER) - RECOMMENDATIONS
[address] Eleonora from infection controlled called stating pt need isolation as pt daughter tested + Covid around New Year 2022.  Pt reports sx at same time  did not test.  PRINCESS Stevenson RN / manager notified
Patient most likely shedding non-viable viral RNA and not infectious

## 2022-02-25 ENCOUNTER — TRANSCRIPTION ENCOUNTER (OUTPATIENT)
Age: 37
End: 2022-02-25

## 2022-02-25 VITALS
TEMPERATURE: 99 F | OXYGEN SATURATION: 99 % | SYSTOLIC BLOOD PRESSURE: 104 MMHG | HEART RATE: 62 BPM | DIASTOLIC BLOOD PRESSURE: 65 MMHG | RESPIRATION RATE: 18 BRPM

## 2022-02-25 LAB — T PALLIDUM AB TITR SER: NEGATIVE — SIGNIFICANT CHANGE UP

## 2022-02-25 PROCEDURE — 59025 FETAL NON-STRESS TEST: CPT

## 2022-02-25 PROCEDURE — 87635 SARS-COV-2 COVID-19 AMP PRB: CPT

## 2022-02-25 PROCEDURE — G0463: CPT

## 2022-02-25 PROCEDURE — 85025 COMPLETE CBC W/AUTO DIFF WBC: CPT

## 2022-02-25 PROCEDURE — 59050 FETAL MONITOR W/REPORT: CPT

## 2022-02-25 PROCEDURE — 86780 TREPONEMA PALLIDUM: CPT

## 2022-02-25 PROCEDURE — 36415 COLL VENOUS BLD VENIPUNCTURE: CPT

## 2022-02-25 PROCEDURE — 86901 BLOOD TYPING SEROLOGIC RH(D): CPT

## 2022-02-25 PROCEDURE — 86769 SARS-COV-2 COVID-19 ANTIBODY: CPT

## 2022-02-25 PROCEDURE — 86900 BLOOD TYPING SEROLOGIC ABO: CPT

## 2022-02-25 PROCEDURE — 86850 RBC ANTIBODY SCREEN: CPT

## 2022-02-25 RX ORDER — IBUPROFEN 200 MG
1 TABLET ORAL
Qty: 0 | Refills: 0 | DISCHARGE
Start: 2022-02-25

## 2022-02-25 RX ORDER — ACETAMINOPHEN 500 MG
2 TABLET ORAL
Qty: 0 | Refills: 0 | DISCHARGE

## 2022-02-25 RX ORDER — SIMETHICONE 80 MG/1
1 TABLET, CHEWABLE ORAL
Qty: 0 | Refills: 0 | DISCHARGE
Start: 2022-02-25

## 2022-02-25 RX ORDER — ACETAMINOPHEN 500 MG
3 TABLET ORAL
Qty: 0 | Refills: 0 | DISCHARGE
Start: 2022-02-25

## 2022-02-25 RX ADMIN — Medication 600 MILLIGRAM(S): at 10:53

## 2022-02-25 RX ADMIN — Medication 975 MILLIGRAM(S): at 12:20

## 2022-02-25 RX ADMIN — Medication 975 MILLIGRAM(S): at 06:03

## 2022-02-25 RX ADMIN — Medication 975 MILLIGRAM(S): at 13:17

## 2022-02-25 RX ADMIN — Medication 975 MILLIGRAM(S): at 00:32

## 2022-02-25 RX ADMIN — Medication 600 MILLIGRAM(S): at 05:29

## 2022-02-25 RX ADMIN — Medication 975 MILLIGRAM(S): at 01:04

## 2022-02-25 RX ADMIN — Medication 600 MILLIGRAM(S): at 04:35

## 2022-02-25 RX ADMIN — Medication 600 MILLIGRAM(S): at 09:53

## 2022-02-25 RX ADMIN — Medication 975 MILLIGRAM(S): at 06:34

## 2022-02-25 NOTE — DISCHARGE NOTE OB - PATIENT PORTAL LINK FT
You can access the FollowMyHealth Patient Portal offered by [facility] by registering at the following website: [email or web address]. By joining [facility]’s FollowMyHealth portal, you will also be able to view your health information using other applications (apps) compatible with our system.

## 2022-02-25 NOTE — DISCHARGE NOTE OB - MEDICATION SUMMARY - MEDICATIONS TO STOP TAKING
I will STOP taking the medications listed below when I get home from the hospital:    birth control pill  -- 1 tab(s) by mouth once a day    Percocet 5/325 oral tablet  -- 1 tab(s) by mouth every 6 hours, As Needed MDD:4 tabs  -- Caution federal law prohibits the transfer of this drug to any person other  than the person for whom it was prescribed.  May cause drowsiness.  Alcohol may intensify this effect.  Use care when operating dangerous machinery.  This prescription cannot be refilled.  This product contains acetaminophen.  Do not use  with any other product containing acetaminophen to prevent possible liver damage.  Using more of this medication than prescribed may cause serious breathing problems.

## 2022-02-25 NOTE — DISCHARGE NOTE OB - CARE PLAN
Principal Discharge DX:	Normal vaginal delivery  Assessment and plan of treatment:	Follow up in 6 weeks   1

## 2022-02-25 NOTE — DISCHARGE NOTE OB - NS MD DC FALL RISK RISK
For information on Fall & Injury Prevention, visit: [email or web address] OR  [email or web address] OR  https://www.cdc.gov/steadi/patient.html

## 2022-02-25 NOTE — PROGRESS NOTE ADULT - SUBJECTIVE AND OBJECTIVE BOX
OB Progress Note:  PPD#1    S: 36yo  PPD#1 s/p . Patient feels well. Pain is well controlled. She is tolerating a regular diet and passing flatus. She is voiding spontaneously, and ambulating without difficulty. Denies CP/SOB. Denies lightheadedness/dizziness. Denies N/V.    O:  Vitals:  Vital Signs Last 24 Hrs  T(C): 37.1 (2022 00:47), Max: 37.2 (2022 16:28)  T(F): 98.8 (2022 00:47), Max: 99 (2022 16:28)  HR: 63 (2022 00:47) (63 - 96)  BP: 98/80 (2022 00:47) (98/80 - 127/78)  BP(mean): 78 (2022 16:28) (78 - 88)  RR: 18 (2022 00:47) (18 - 18)  SpO2: 97% (2022 00:47) (87% - 100%)    MEDICATIONS  (STANDING):  acetaminophen     Tablet .. 975 milliGRAM(s) Oral <User Schedule>  ampicillin  IVPB      ampicillin  IVPB 2 Gram(s) IV Intermittent every 6 hours  diphtheria/tetanus/pertussis (acellular) Vaccine (ADAcel) 0.5 milliLiter(s) IntraMuscular once  ibuprofen  Tablet. 600 milliGRAM(s) Oral every 6 hours  oxytocin Infusion 333.333 milliUNIT(s)/Min (1000 mL/Hr) IV Continuous <Continuous>  prenatal multivitamin 1 Tablet(s) Oral daily  sodium chloride 0.9% lock flush 3 milliLiter(s) IV Push every 8 hours      Labs:  Blood type: O Positive  Rubella IgG: RPR: Negative                          13.1   8.57 >-----------< 153    (  @ 13:31 )             39.6      Physical Exam:  General: NAD  Abdomen: soft, non-tender, non-distended, fundus firm  Vaginal: Lochia wnl  Extremities: No erythema/edema

## 2022-02-25 NOTE — DISCHARGE NOTE OB - CARE PROVIDER_API CALL
Rivera Ryder)  Obstetrics and Gynecology  [address] [place] [address]  Phone: [phone]  Fax: [phone]  Follow Up Time:

## 2022-02-25 NOTE — PROGRESS NOTE ADULT - ASSESSMENT
A/P: 36yo PPD#1 s/p .  Patient is stable and doing well post-partum.   - Pain well controlled, continue current pain regimen  - Increase ambulation, SCDs when not ambulating  - Continue regular diet    Swapna Haque MD PGY1

## 2022-07-14 ENCOUNTER — NON-APPOINTMENT (OUTPATIENT)
Age: 37
End: 2022-07-14

## 2023-03-28 ENCOUNTER — NON-APPOINTMENT (OUTPATIENT)
Age: 38
End: 2023-03-28

## 2023-07-18 ENCOUNTER — NON-APPOINTMENT (OUTPATIENT)
Age: 38
End: 2023-07-18

## 2023-10-06 NOTE — H&P PST ADULT - HIV STATUS
From: Ivory Whittington  To: Jennie Carrington  Sent: 10/6/2023 10:38 AM CDT  Subject: Phentermjarrod Schneider- I was not able to get to prescription as pharmacy won’t have till Monday. I assume no concerns if I skip 2 days?    Negative/Unknown

## 2023-11-13 NOTE — OB RN DELIVERY SUMMARY - NS_CORDBLDBNKA_OBGYN_ALL_OB
Patient is calling back and advised of Dr. Pedraza's message below. Patient reports that she has been taking antiinflammatories since Friday, but doesn't believe they have made much of a difference - she's been having the same symptoms. Routing to PCP.       No

## 2024-03-07 NOTE — OB RN PATIENT PROFILE - NS_PRENATALLABSOURCEGBS36_OBGYN_ALL_OB
Reviewed age-appropriate health maintenance and preventive care.    
hard copy, drawn during this pregnancy

## 2024-09-15 PROBLEM — Z78.9 NO PERTINENT PAST MEDICAL HISTORY: Status: RESOLVED | Noted: 2024-09-15 | Resolved: 2024-09-15

## 2024-09-15 PROBLEM — S92.424A CLOSED NONDISPLACED FRACTURE OF DISTAL PHALANX OF RIGHT GREAT TOE, INITIAL ENCOUNTER: Status: ACTIVE | Noted: 2024-09-15

## 2024-09-30 ENCOUNTER — APPOINTMENT (OUTPATIENT)
Dept: ORTHOPEDIC SURGERY | Facility: CLINIC | Age: 39
End: 2024-09-30

## 2025-02-06 ENCOUNTER — LABORATORY RESULT (OUTPATIENT)
Age: 40
End: 2025-02-06

## 2025-02-06 ENCOUNTER — APPOINTMENT (OUTPATIENT)
Dept: FAMILY MEDICINE | Facility: CLINIC | Age: 40
End: 2025-02-06
Payer: COMMERCIAL

## 2025-02-06 ENCOUNTER — NON-APPOINTMENT (OUTPATIENT)
Age: 40
End: 2025-02-06

## 2025-02-06 VITALS
RESPIRATION RATE: 16 BRPM | HEIGHT: 61 IN | HEART RATE: 76 BPM | OXYGEN SATURATION: 99 % | BODY MASS INDEX: 24.55 KG/M2 | WEIGHT: 130 LBS | TEMPERATURE: 98.2 F | DIASTOLIC BLOOD PRESSURE: 76 MMHG | SYSTOLIC BLOOD PRESSURE: 122 MMHG

## 2025-02-06 DIAGNOSIS — R79.89 OTHER SPECIFIED ABNORMAL FINDINGS OF BLOOD CHEMISTRY: ICD-10-CM

## 2025-02-06 DIAGNOSIS — T78.01XA ANAPHYLACTIC REACTION DUE TO PEANUTS, INITIAL ENCOUNTER: ICD-10-CM

## 2025-02-06 DIAGNOSIS — Z00.00 ENCOUNTER FOR GENERAL ADULT MEDICAL EXAMINATION W/OUT ABNORMAL FINDINGS: ICD-10-CM

## 2025-02-06 DIAGNOSIS — Z91.010 ALLERGY TO PEANUTS: ICD-10-CM

## 2025-02-06 PROCEDURE — 36415 COLL VENOUS BLD VENIPUNCTURE: CPT

## 2025-02-06 PROCEDURE — 99385 PREV VISIT NEW AGE 18-39: CPT | Mod: 25

## 2025-02-06 RX ORDER — TRETINOIN 0.1 MG/G
0.01 GEL TOPICAL
Refills: 0 | Status: ACTIVE | COMMUNITY
Start: 2025-02-06

## 2025-02-06 RX ORDER — EPINEPHRINE 0.3 MG/.3ML
0.3 INJECTION INTRAMUSCULAR
Qty: 1 | Refills: 0 | Status: ACTIVE | COMMUNITY
Start: 2025-02-06 | End: 1900-01-01

## 2025-02-07 DIAGNOSIS — R94.6 ABNORMAL RESULTS OF THYROID FUNCTION STUDIES: ICD-10-CM

## 2025-02-07 LAB
25(OH)D3 SERPL-MCNC: 24.8 NG/ML
ALBUMIN SERPL ELPH-MCNC: 5 G/DL
ALP BLD-CCNC: 45 U/L
ALT SERPL-CCNC: 15 U/L
ANION GAP SERPL CALC-SCNC: 14 MMOL/L
APPEARANCE: CLEAR
AST SERPL-CCNC: 18 U/L
BASOPHILS # BLD AUTO: 0.02 K/UL
BASOPHILS NFR BLD AUTO: 0.4 %
BILIRUB SERPL-MCNC: 0.7 MG/DL
BILIRUBIN URINE: NEGATIVE
BLOOD URINE: NEGATIVE
BUN SERPL-MCNC: 12 MG/DL
CALCIUM SERPL-MCNC: 9.5 MG/DL
CHLORIDE SERPL-SCNC: 101 MMOL/L
CHOLEST SERPL-MCNC: 189 MG/DL
CO2 SERPL-SCNC: 26 MMOL/L
COLOR: YELLOW
CREAT SERPL-MCNC: 0.7 MG/DL
EGFR: 113 ML/MIN/1.73M2
EOSINOPHIL # BLD AUTO: 0.1 K/UL
EOSINOPHIL NFR BLD AUTO: 1.8 %
ESTIMATED AVERAGE GLUCOSE: 88 MG/DL
GLUCOSE QUALITATIVE U: NEGATIVE MG/DL
GLUCOSE SERPL-MCNC: 77 MG/DL
HBA1C MFR BLD HPLC: 4.7 %
HCT VFR BLD CALC: 42.1 %
HDLC SERPL-MCNC: 86 MG/DL
HGB BLD-MCNC: 13.4 G/DL
IMM GRANULOCYTES NFR BLD AUTO: 0.2 %
KETONES URINE: NEGATIVE MG/DL
LDLC SERPL CALC-MCNC: 92 MG/DL
LEUKOCYTE ESTERASE URINE: NEGATIVE
LYMPHOCYTES # BLD AUTO: 1.56 K/UL
LYMPHOCYTES NFR BLD AUTO: 28.3 %
MAN DIFF?: NORMAL
MCHC RBC-ENTMCNC: 30.7 PG
MCHC RBC-ENTMCNC: 31.8 G/DL
MCV RBC AUTO: 96.6 FL
MONOCYTES # BLD AUTO: 0.36 K/UL
MONOCYTES NFR BLD AUTO: 6.5 %
NEUTROPHILS # BLD AUTO: 3.46 K/UL
NEUTROPHILS NFR BLD AUTO: 62.8 %
NITRITE URINE: NEGATIVE
NONHDLC SERPL-MCNC: 103 MG/DL
PH URINE: 7.5
PLATELET # BLD AUTO: 205 K/UL
POTASSIUM SERPL-SCNC: 3.7 MMOL/L
PROT SERPL-MCNC: 7.8 G/DL
PROTEIN URINE: NEGATIVE MG/DL
RBC # BLD: 4.36 M/UL
RBC # FLD: 13.7 %
SODIUM SERPL-SCNC: 141 MMOL/L
SPECIFIC GRAVITY URINE: 1.01
TRIGL SERPL-MCNC: 63 MG/DL
TSH SERPL-ACNC: 15.4 UIU/ML
UROBILINOGEN URINE: 0.2 MG/DL
WBC # FLD AUTO: 5.51 K/UL

## 2025-02-19 ENCOUNTER — NON-APPOINTMENT (OUTPATIENT)
Age: 40
End: 2025-02-19

## 2025-02-24 RX ORDER — LEVOTHYROXINE SODIUM 0.03 MG/1
25 TABLET ORAL
Qty: 30 | Refills: 1 | Status: ACTIVE | COMMUNITY
Start: 2025-02-24 | End: 1900-01-01

## 2025-03-11 ENCOUNTER — RESULT REVIEW (OUTPATIENT)
Age: 40
End: 2025-03-11

## 2025-04-02 ENCOUNTER — TRANSCRIPTION ENCOUNTER (OUTPATIENT)
Age: 40
End: 2025-04-02

## 2025-04-02 ENCOUNTER — APPOINTMENT (OUTPATIENT)
Dept: FAMILY MEDICINE | Facility: CLINIC | Age: 40
End: 2025-04-02
Payer: COMMERCIAL

## 2025-04-02 VITALS
BODY MASS INDEX: 24.55 KG/M2 | WEIGHT: 130 LBS | DIASTOLIC BLOOD PRESSURE: 69 MMHG | SYSTOLIC BLOOD PRESSURE: 109 MMHG | HEIGHT: 61 IN | TEMPERATURE: 97.9 F | HEART RATE: 60 BPM | OXYGEN SATURATION: 100 % | RESPIRATION RATE: 14 BRPM

## 2025-04-02 DIAGNOSIS — R23.8 OTHER SKIN CHANGES: ICD-10-CM

## 2025-04-02 DIAGNOSIS — R94.6 ABNORMAL RESULTS OF THYROID FUNCTION STUDIES: ICD-10-CM

## 2025-04-02 PROCEDURE — 99213 OFFICE O/P EST LOW 20 MIN: CPT

## 2025-04-02 PROCEDURE — 36415 COLL VENOUS BLD VENIPUNCTURE: CPT

## 2025-04-04 LAB
T4 FREE SERPL-MCNC: 1.2 NG/DL
TSH SERPL-ACNC: 4.77 UIU/ML

## 2025-05-16 ENCOUNTER — APPOINTMENT (OUTPATIENT)
Dept: FAMILY MEDICINE | Facility: CLINIC | Age: 40
End: 2025-05-16
Payer: COMMERCIAL

## 2025-05-16 VITALS
DIASTOLIC BLOOD PRESSURE: 76 MMHG | TEMPERATURE: 97.5 F | HEIGHT: 61 IN | SYSTOLIC BLOOD PRESSURE: 112 MMHG | RESPIRATION RATE: 16 BRPM | HEART RATE: 65 BPM | BODY MASS INDEX: 24.55 KG/M2 | WEIGHT: 130 LBS | OXYGEN SATURATION: 99 %

## 2025-05-16 DIAGNOSIS — Z86.69 PERSONAL HISTORY OF OTHER DISEASES OF THE NERVOUS SYSTEM AND SENSE ORGANS: ICD-10-CM

## 2025-05-16 DIAGNOSIS — G43.909 MIGRAINE, UNSPECIFIED, NOT INTRACTABLE, W/OUT STATUS MIGRAINOSUS: ICD-10-CM

## 2025-05-16 DIAGNOSIS — R94.6 ABNORMAL RESULTS OF THYROID FUNCTION STUDIES: ICD-10-CM

## 2025-05-16 PROCEDURE — 36415 COLL VENOUS BLD VENIPUNCTURE: CPT

## 2025-05-16 PROCEDURE — 99213 OFFICE O/P EST LOW 20 MIN: CPT

## 2025-05-17 LAB
T4 FREE SERPL-MCNC: 1.5 NG/DL
TSH SERPL-ACNC: 3.77 UIU/ML

## 2025-05-27 ENCOUNTER — RX RENEWAL (OUTPATIENT)
Age: 40
End: 2025-05-27

## 2025-08-09 ENCOUNTER — RX RENEWAL (OUTPATIENT)
Age: 40
End: 2025-08-09